# Patient Record
Sex: FEMALE | Race: WHITE | NOT HISPANIC OR LATINO | URBAN - METROPOLITAN AREA
[De-identification: names, ages, dates, MRNs, and addresses within clinical notes are randomized per-mention and may not be internally consistent; named-entity substitution may affect disease eponyms.]

---

## 2024-04-04 ENCOUNTER — HOSPITAL ENCOUNTER (OUTPATIENT)
Dept: RADIOLOGY | Facility: CLINIC | Age: 59
Discharge: HOME | End: 2024-04-04
Payer: MEDICARE

## 2024-04-04 VITALS — HEIGHT: 65 IN | BODY MASS INDEX: 31.65 KG/M2 | WEIGHT: 190 LBS

## 2024-04-04 DIAGNOSIS — Z12.31 ENCOUNTER FOR SCREENING MAMMOGRAM FOR MALIGNANT NEOPLASM OF BREAST: ICD-10-CM

## 2024-04-04 PROCEDURE — 77067 SCR MAMMO BI INCL CAD: CPT | Performed by: RADIOLOGY

## 2024-04-04 PROCEDURE — 77067 SCR MAMMO BI INCL CAD: CPT

## 2024-04-04 PROCEDURE — 77063 BREAST TOMOSYNTHESIS BI: CPT | Performed by: RADIOLOGY

## 2024-04-06 ENCOUNTER — LAB (OUTPATIENT)
Dept: LAB | Facility: LAB | Age: 59
End: 2024-04-06
Payer: MEDICARE

## 2024-04-06 DIAGNOSIS — Z00.00 ENCOUNTER FOR GENERAL ADULT MEDICAL EXAMINATION WITHOUT ABNORMAL FINDINGS: ICD-10-CM

## 2024-04-06 DIAGNOSIS — E03.9 HYPOTHYROIDISM, UNSPECIFIED: Primary | ICD-10-CM

## 2024-04-06 PROCEDURE — 36415 COLL VENOUS BLD VENIPUNCTURE: CPT

## 2024-04-06 PROCEDURE — 84443 ASSAY THYROID STIM HORMONE: CPT

## 2024-04-06 PROCEDURE — 80053 COMPREHEN METABOLIC PANEL: CPT

## 2024-04-06 PROCEDURE — 80061 LIPID PANEL: CPT

## 2024-04-06 PROCEDURE — 84439 ASSAY OF FREE THYROXINE: CPT

## 2024-04-07 LAB
ALBUMIN SERPL BCP-MCNC: 3.3 G/DL (ref 3.4–5)
ALP SERPL-CCNC: 38 U/L (ref 33–110)
ALT SERPL W P-5'-P-CCNC: 14 U/L (ref 7–45)
ANION GAP SERPL CALC-SCNC: 12 MMOL/L (ref 10–20)
AST SERPL W P-5'-P-CCNC: 15 U/L (ref 9–39)
BILIRUB SERPL-MCNC: 0.3 MG/DL (ref 0–1.2)
BUN SERPL-MCNC: 18 MG/DL (ref 6–23)
CALCIUM SERPL-MCNC: 9.2 MG/DL (ref 8.6–10.6)
CHLORIDE SERPL-SCNC: 110 MMOL/L (ref 98–107)
CHOLEST SERPL-MCNC: 189 MG/DL (ref 0–199)
CHOLESTEROL/HDL RATIO: 3.1
CO2 SERPL-SCNC: 26 MMOL/L (ref 21–32)
CREAT SERPL-MCNC: 0.79 MG/DL (ref 0.5–1.05)
EGFRCR SERPLBLD CKD-EPI 2021: 86 ML/MIN/1.73M*2
GLUCOSE SERPL-MCNC: 86 MG/DL (ref 74–99)
HDLC SERPL-MCNC: 60.9 MG/DL
LDLC SERPL CALC-MCNC: 110 MG/DL
NON HDL CHOLESTEROL: 128 MG/DL (ref 0–149)
POTASSIUM SERPL-SCNC: 5.3 MMOL/L (ref 3.5–5.3)
PROT SERPL-MCNC: 5.7 G/DL (ref 6.4–8.2)
SODIUM SERPL-SCNC: 143 MMOL/L (ref 136–145)
T4 FREE SERPL-MCNC: 1.06 NG/DL (ref 0.78–1.48)
TRIGL SERPL-MCNC: 92 MG/DL (ref 0–149)
TSH SERPL-ACNC: 5.17 MIU/L (ref 0.44–3.98)
VLDL: 18 MG/DL (ref 0–40)

## 2024-04-12 ENCOUNTER — HOSPITAL ENCOUNTER (OUTPATIENT)
Dept: RADIOLOGY | Facility: EXTERNAL LOCATION | Age: 59
Discharge: HOME | End: 2024-04-12

## 2024-05-13 ENCOUNTER — OFFICE VISIT (OUTPATIENT)
Dept: OBSTETRICS AND GYNECOLOGY | Facility: CLINIC | Age: 59
End: 2024-05-13
Payer: MEDICARE

## 2024-05-13 VITALS
HEIGHT: 65 IN | SYSTOLIC BLOOD PRESSURE: 143 MMHG | BODY MASS INDEX: 32.19 KG/M2 | DIASTOLIC BLOOD PRESSURE: 84 MMHG | OXYGEN SATURATION: 98 % | WEIGHT: 193.2 LBS | HEART RATE: 75 BPM

## 2024-05-13 DIAGNOSIS — N95.0 PMB (POSTMENOPAUSAL BLEEDING): Primary | ICD-10-CM

## 2024-05-13 DIAGNOSIS — Z79.890 CURRENT LONG-TERM USE OF POSTMENOPAUSAL HORMONE REPLACEMENT THERAPY: ICD-10-CM

## 2024-05-13 PROCEDURE — 99203 OFFICE O/P NEW LOW 30 MIN: CPT | Performed by: NURSE PRACTITIONER

## 2024-05-13 RX ORDER — ZINC GLUCONATE 50 MG
TABLET ORAL
COMMUNITY

## 2024-05-13 RX ORDER — VALACYCLOVIR HYDROCHLORIDE 1 G/1
TABLET, FILM COATED ORAL
COMMUNITY
Start: 2024-03-29

## 2024-05-13 RX ORDER — ACETAMINOPHEN 500 MG
TABLET ORAL
COMMUNITY
Start: 2024-03-29

## 2024-05-13 RX ORDER — ESTRADIOL 0.5 MG/1
0.5 TABLET ORAL DAILY
COMMUNITY

## 2024-05-13 RX ORDER — LEVOTHYROXINE SODIUM 50 UG/1
50 TABLET ORAL DAILY
COMMUNITY

## 2024-05-13 RX ORDER — CETIRIZINE HYDROCHLORIDE AND PSEUDOEPHEDRINE HYDROCHLORIDE 5; 120 MG/1; MG/1
TABLET, FILM COATED, EXTENDED RELEASE ORAL
COMMUNITY
Start: 2022-03-15

## 2024-05-13 RX ORDER — MEDROXYPROGESTERONE ACETATE 2.5 MG/1
2.5 TABLET ORAL
COMMUNITY
Start: 2024-04-02

## 2024-05-13 RX ORDER — FLUTICASONE PROPIONATE 50 MCG
SPRAY, SUSPENSION (ML) NASAL
COMMUNITY
Start: 2024-03-29

## 2024-05-13 NOTE — PROGRESS NOTES
HPI: Annika Lowry is a 58 y.o. year old  presenting for abnormal bleeding.  Has been on Prempro HRT for 13 years, postmenopausal when started. Currently on estradiol 0.5/medroxyprog 2.5mg daily for past year. Had been having spotting only on Prempro possibly 4 times if 14 years, never had US  March period for 5 days  April spotting for 3 days, felt that she spotting when her daughter was pregnant and then lost baby  Hot flashes, not as extreme. Night sweats- better on HRT combo  Did try going off for one month after 10 years, but swore she would never do that again.    Pt new to this practice  Seen previously at [facility] by Gyn    LMP:  No LMP recorded, postmenopausal, age 45yo.  Cyclic symptoms include: n/a  Vaginal discharge: no  Sexually active: no  Problems with intercourse: no   Last pap: 2022 normal, neg HPV  History of abnormal pap: previous pap with pos HPV, colpo with [facility] I  Other gyn history: FT    OB History        1    Para        Term                AB        Living   1       SAB        IAB        Ectopic        Multiple        Live Births   1              No past medical history on file.   Past Surgical History:  No date: ELBOW SURGERY   Social History    Socioeconomic History      Marital status:       Spouse name: Not on file      Number of children: Not on file      Years of education: Not on file      Highest education level: Not on file    Occupational History      Not on file    Tobacco Use      Smoking status: Former        Packs/day: 0.25        Years: 0.3 packs/day for 15.0 years (3.8 ttl pk-yrs)        Types: Cigarettes      Smokeless tobacco: Never      Tobacco comments: Socially mostly Quit for good at age 33    Substance and Sexual Activity      Alcohol use: Yes        Alcohol/week: 7.0 standard drinks of alcohol        Types: 7 Standard drinks or equivalent per week      Drug use: Never      Sexual activity: Yes        Partners: Male        Birth  control/protection: Post-menopausal, Male Sterilization    Other Topics      Concerns:        Not on file    Social History Narrative      Not on file    Social Determinants of Health  Financial Resource Strain: Low Risk  (3/6/2023)      Received from [facility]      Overall Financial Resource Strain (CARDIA)          Difficulty of Paying Living Expenses: Not hard at all  Food Insecurity: No Food Insecurity (3/6/2023)      Received from [facility]      Hunger Vital Sign          Worried About Running Out of Food in the Last Year: Never true          Ran Out of Food in the Last Year: Never true  Transportation Needs: No Transportation Needs (3/6/2023)      Received from [facility]      PRAPARE - Transportation          Lack of Transportation (Medical): No          Lack of Transportation (Non-Medical): No  Physical Activity: Inactive (3/6/2023)      Received from [facility]      Exercise Vital Sign          Days of Exercise per Week: 0 days          Minutes of Exercise per Session: 0 min  Stress: No Stress Concern Present (3/6/2023)      Received from [facility]      [employer] - Occupational Stress Questionnaire          Feeling of Stress : Only a little  Social Connections: Socially Integrated (3/6/2023)      Received from [facility]      Social Connection and Isolation Panel [NHANES]          Frequency of Communication with Friends and Family: More than three times a week          Frequency of Social Gatherings with Friends and Family: More than three times a week          Attends L[employer] day Services: More than 4 times per year          Active Member of Clubs or Organizations: Yes          Attends Club or Organization Meetings: More than 4 times per year          Marital Status:   Intimate Partner Violence: Not on file  Housing Stability: Unknown (3/6/2023)      Received from [facility]      Housing Stability Vital Sign          Unable to  Pay for Housing in the Last Year: Patient refused          Number of Places Lived in the Last Year: 1          Unstable Housing in the Last Year: No   Review of patient's family history indicates:  Problem: Vision loss      Relation: Mother          Name: Ezekiel Hay              Age of Onset: (Not Specified)  Problem: Prostate cancer      Relation: Father          Name: Je Hay              Age of Onset: (Not Specified)  Problem: COPD      Relation: Father          Name: Je Hay              Age of Onset: (Not Specified)  Problem: Hypertension      Relation: Sister          Name: Valerie Chester Venus              Age of Onset: (Not Specified)  Problem: Miscarriages / Stillbirths      Relation: Sister          Name: Nemo              Age of Onset: (Not Specified)  Problem: Heart disease      Relation: Brother          Name: Wade Hay              Age of Onset: (Not Specified)  Problem: Hypertension      Relation: Brother          Name: Mahesh              Age of Onset: (Not Specified)  Problem: Miscarriages / Stillbirths      Relation: Daughter          Name: Shwetha              Age of Onset: (Not Specified)     Current Outpatient Medications:   ascorbic acid (Vitamin C) 100 mg tablet, ORAL, DAILY, Date: 3/29/24 2:21:00 PM EDT, Disp: , Rfl:   cholecalciferol (Vitamin D-3) 5,000 Units tablet, ORAL, 0 Refill(s), Date: 3/29/24 2:19:00 PM EDT, Disp: , Rfl:   cyanocobalamin, vitamin B-12, (VITAMIN B-12 SL), DAILY, Date: 3/29/24 2:21:00 PM EDT, Disp: , Rfl:   fluticasone (Flonase) 50 mcg/actuation nasal spray, 16 g, 0 Refill(s), USE 2 SPRAYS IN EACH NOSTRIL ONCE DAILY, Refill(s) 0, Date: 3/29/24 2:18:00 PM EDT, Disp: , Rfl:   medroxyPROGESTERone (Provera) 2.5 mg tablet, Take 1 tablet (2.5 mg) by mouth once daily., Disp: , Rfl:   valACYclovir (Valtrex) 1 gram tablet, 4 EA, 0 Refill(s), TAKE 2 TABLETS BY MOUTH TWICE DAILY FOR 1 DAY. FOR 1 DAY, Date: 3/29/24 2:17:00 PM EDT, Disp: , Rfl:   ZyrTEC-D 5-120 mg 12  "hr tablet, ORAL, 0 Refill(s), Refill(s) 0, Date: 3/15/22 8:00:00 PM EDT, Disp: , Rfl:   ELDERBERRY FRUIT ORAL, Take by mouth once daily., Disp: , Rfl:   estradiol (Estrace) 0.5 mg tablet, Take 1 tablet (0.5 mg) by mouth once daily., Disp: , Rfl:   levothyroxine (Synthroid, Levoxyl) 50 mcg tablet, Take 1 tablet (50 mcg) by mouth once daily., Disp: , Rfl:   zinc gluconate 50 mg tablet, Take by mouth once daily., Disp: , Rfl:           REVIEW OF SYSTEMS:  : denies dysuria, hematuria, occas urge incontinence for past year  Gl: denies change in bowel habits, blood in stools      PHYSICAL EXAM:  Vitals: /84   Pulse 75   Ht 1.651 m (5' 5\")   Wt 87.6 kg (193 lb 3.2 oz)   SpO2 98%   BMI 32.15 kg/m²   Gen: well appearing woman in NAD  HEENT: normocephalic    ASSESSMENT/PLAN :    1. PMB (postmenopausal bleeding)  Irregular menstrual bleeding: discussed possible etiologies including hormonal, polyps or fibroids, and endometrial hyperplasia or cancer. Discussed importance of thorough evaluation to rule out intrauterine pathology    Recommend transvaginal ultrasound and possibly endometrial biopsy to follow.  Labs: n/a  Discussed possible treatment recommendations including observation, medical, and surgical management options depending on outcome of evaluation.  Follow up after ultrasound for annual and discuss biosy   - US PELVIS TRANSABDOMINAL WITH TRANSVAGINAL; Future    2. Current long-term use of postmenopausal hormone replacement therapy  Pt not interested to stop HRT  - US PELVIS TRANSABDOMINAL WITH TRANSVAGINAL; Future   "

## 2024-05-20 ENCOUNTER — HOSPITAL ENCOUNTER (OUTPATIENT)
Dept: RADIOLOGY | Facility: CLINIC | Age: 59
Discharge: HOME | End: 2024-05-20
Payer: MEDICARE

## 2024-05-20 DIAGNOSIS — N95.0 PMB (POSTMENOPAUSAL BLEEDING): ICD-10-CM

## 2024-05-20 DIAGNOSIS — Z79.890 CURRENT LONG-TERM USE OF POSTMENOPAUSAL HORMONE REPLACEMENT THERAPY: ICD-10-CM

## 2024-05-20 PROCEDURE — 76856 US EXAM PELVIC COMPLETE: CPT | Performed by: RADIOLOGY

## 2024-05-20 PROCEDURE — 76856 US EXAM PELVIC COMPLETE: CPT

## 2024-05-20 PROCEDURE — 76830 TRANSVAGINAL US NON-OB: CPT | Performed by: RADIOLOGY

## 2024-06-14 DIAGNOSIS — N95.1 MENOPAUSAL SYMPTOMS: Primary | ICD-10-CM

## 2024-06-14 NOTE — TELEPHONE ENCOUNTER
Unable to reach pt by phone. LM on VM would like to review ultrasound results and recommendations for next steps for evaluation. Office number provided for return call. Chacha Pretty, APRN-CNP

## 2024-06-24 RX ORDER — MEDROXYPROGESTERONE ACETATE 2.5 MG/1
2.5 TABLET ORAL
Qty: 90 TABLET | Refills: 3 | Status: SHIPPED | OUTPATIENT
Start: 2024-06-24

## 2024-06-24 RX ORDER — ESTRADIOL 0.5 MG/1
0.5 TABLET ORAL DAILY
Qty: 90 TABLET | Refills: 3 | Status: SHIPPED | OUTPATIENT
Start: 2024-06-24

## 2024-06-24 NOTE — TELEPHONE ENCOUNTER
Patient called returning the call from Chacha. She would like to know what her results are and what the next steps would be. She is also requesting refills on 2 medications which have been pended for approval.   Please review and advise.

## 2024-07-05 ENCOUNTER — TELEPHONE (OUTPATIENT)
Dept: OBSTETRICS AND GYNECOLOGY | Facility: CLINIC | Age: 59
End: 2024-07-05

## 2024-07-05 NOTE — TELEPHONE ENCOUNTER
Unable again to reach pt by phone to discuss ultrasound and next steps in order to obtain biopsy. Would like to review results and recs. Office number provided for pt to return call. Chacha Pretty, APRN-CNP

## 2024-07-10 ENCOUNTER — APPOINTMENT (OUTPATIENT)
Dept: OBSTETRICS AND GYNECOLOGY | Facility: CLINIC | Age: 59
End: 2024-07-10
Payer: MEDICARE

## 2024-07-29 ENCOUNTER — APPOINTMENT (OUTPATIENT)
Dept: OBSTETRICS AND GYNECOLOGY | Facility: CLINIC | Age: 59
End: 2024-07-29
Payer: MEDICARE